# Patient Record
Sex: FEMALE | Race: WHITE | NOT HISPANIC OR LATINO | Employment: FULL TIME | URBAN - METROPOLITAN AREA
[De-identification: names, ages, dates, MRNs, and addresses within clinical notes are randomized per-mention and may not be internally consistent; named-entity substitution may affect disease eponyms.]

---

## 2018-09-13 ENCOUNTER — APPOINTMENT (OUTPATIENT)
Dept: RADIOLOGY | Facility: MEDICAL CENTER | Age: 53
End: 2018-09-13
Attending: STUDENT IN AN ORGANIZED HEALTH CARE EDUCATION/TRAINING PROGRAM
Payer: COMMERCIAL

## 2018-09-13 ENCOUNTER — HOSPITAL ENCOUNTER (OUTPATIENT)
Facility: MEDICAL CENTER | Age: 53
End: 2018-09-14
Attending: EMERGENCY MEDICINE | Admitting: INTERNAL MEDICINE
Payer: COMMERCIAL

## 2018-09-13 ENCOUNTER — APPOINTMENT (OUTPATIENT)
Dept: RADIOLOGY | Facility: MEDICAL CENTER | Age: 53
End: 2018-09-13
Attending: EMERGENCY MEDICINE
Payer: COMMERCIAL

## 2018-09-13 DIAGNOSIS — R07.9 CHEST PAIN SYNDROME: ICD-10-CM

## 2018-09-13 PROBLEM — J43.8 OTHER EMPHYSEMA (HCC): Status: ACTIVE | Noted: 2018-09-13

## 2018-09-13 PROBLEM — M47.812 SPONDYLOSIS OF CERVICAL REGION WITHOUT MYELOPATHY OR RADICULOPATHY: Status: ACTIVE | Noted: 2018-09-13

## 2018-09-13 PROBLEM — R10.31 RIGHT LOWER QUADRANT ABDOMINAL PAIN: Status: ACTIVE | Noted: 2018-09-13

## 2018-09-13 PROBLEM — J45.20 MILD INTERMITTENT ASTHMA WITHOUT COMPLICATION: Status: ACTIVE | Noted: 2018-09-13

## 2018-09-13 LAB
ALBUMIN SERPL BCP-MCNC: 4 G/DL (ref 3.2–4.9)
ALBUMIN/GLOB SERPL: 1.4 G/DL
ALP SERPL-CCNC: 62 U/L (ref 30–99)
ALT SERPL-CCNC: 9 U/L (ref 2–50)
ANION GAP SERPL CALC-SCNC: 7 MMOL/L (ref 0–11.9)
APTT PPP: 28.3 SEC (ref 24.7–36)
AST SERPL-CCNC: 16 U/L (ref 12–45)
BASOPHILS # BLD AUTO: 0.5 % (ref 0–1.8)
BASOPHILS # BLD: 0.05 K/UL (ref 0–0.12)
BILIRUB SERPL-MCNC: 0.9 MG/DL (ref 0.1–1.5)
BUN SERPL-MCNC: 12 MG/DL (ref 8–22)
CALCIUM SERPL-MCNC: 9.5 MG/DL (ref 8.5–10.5)
CHLORIDE SERPL-SCNC: 106 MMOL/L (ref 96–112)
CO2 SERPL-SCNC: 26 MMOL/L (ref 20–33)
CREAT SERPL-MCNC: 0.75 MG/DL (ref 0.5–1.4)
EKG IMPRESSION: NORMAL
EKG IMPRESSION: NORMAL
EOSINOPHIL # BLD AUTO: 0.25 K/UL (ref 0–0.51)
EOSINOPHIL NFR BLD: 2.6 % (ref 0–6.9)
ERYTHROCYTE [DISTWIDTH] IN BLOOD BY AUTOMATED COUNT: 49.2 FL (ref 35.9–50)
GLOBULIN SER CALC-MCNC: 2.8 G/DL (ref 1.9–3.5)
GLUCOSE SERPL-MCNC: 86 MG/DL (ref 65–99)
HCT VFR BLD AUTO: 42.8 % (ref 37–47)
HGB BLD-MCNC: 14 G/DL (ref 12–16)
IMM GRANULOCYTES # BLD AUTO: 0.03 K/UL (ref 0–0.11)
IMM GRANULOCYTES NFR BLD AUTO: 0.3 % (ref 0–0.9)
INR PPP: 1.04 (ref 0.87–1.13)
LYMPHOCYTES # BLD AUTO: 2.96 K/UL (ref 1–4.8)
LYMPHOCYTES NFR BLD: 30.6 % (ref 22–41)
MCH RBC QN AUTO: 29.9 PG (ref 27–33)
MCHC RBC AUTO-ENTMCNC: 32.7 G/DL (ref 33.6–35)
MCV RBC AUTO: 91.3 FL (ref 81.4–97.8)
MONOCYTES # BLD AUTO: 0.91 K/UL (ref 0–0.85)
MONOCYTES NFR BLD AUTO: 9.4 % (ref 0–13.4)
NEUTROPHILS # BLD AUTO: 5.47 K/UL (ref 2–7.15)
NEUTROPHILS NFR BLD: 56.6 % (ref 44–72)
NRBC # BLD AUTO: 0 K/UL
NRBC BLD-RTO: 0 /100 WBC
PLATELET # BLD AUTO: 176 K/UL (ref 164–446)
PMV BLD AUTO: 11.6 FL (ref 9–12.9)
POTASSIUM SERPL-SCNC: 4 MMOL/L (ref 3.6–5.5)
PROT SERPL-MCNC: 6.8 G/DL (ref 6–8.2)
PROTHROMBIN TIME: 13.3 SEC (ref 12–14.6)
RBC # BLD AUTO: 4.69 M/UL (ref 4.2–5.4)
SODIUM SERPL-SCNC: 139 MMOL/L (ref 135–145)
TROPONIN I SERPL-MCNC: <0.01 NG/ML (ref 0–0.04)
TROPONIN I SERPL-MCNC: <0.01 NG/ML (ref 0–0.04)
WBC # BLD AUTO: 9.7 K/UL (ref 4.8–10.8)

## 2018-09-13 PROCEDURE — 85025 COMPLETE CBC W/AUTO DIFF WBC: CPT

## 2018-09-13 PROCEDURE — 99285 EMERGENCY DEPT VISIT HI MDM: CPT

## 2018-09-13 PROCEDURE — 85730 THROMBOPLASTIN TIME PARTIAL: CPT

## 2018-09-13 PROCEDURE — A9270 NON-COVERED ITEM OR SERVICE: HCPCS | Performed by: STUDENT IN AN ORGANIZED HEALTH CARE EDUCATION/TRAINING PROGRAM

## 2018-09-13 PROCEDURE — 84484 ASSAY OF TROPONIN QUANT: CPT

## 2018-09-13 PROCEDURE — 99219 PR INITIAL OBSERVATION CARE,LEVL II: CPT | Mod: GC | Performed by: INTERNAL MEDICINE

## 2018-09-13 PROCEDURE — G0378 HOSPITAL OBSERVATION PER HR: HCPCS

## 2018-09-13 PROCEDURE — 74018 RADEX ABDOMEN 1 VIEW: CPT

## 2018-09-13 PROCEDURE — 96372 THER/PROPH/DIAG INJ SC/IM: CPT

## 2018-09-13 PROCEDURE — 700111 HCHG RX REV CODE 636 W/ 250 OVERRIDE (IP): Performed by: STUDENT IN AN ORGANIZED HEALTH CARE EDUCATION/TRAINING PROGRAM

## 2018-09-13 PROCEDURE — 700102 HCHG RX REV CODE 250 W/ 637 OVERRIDE(OP): Performed by: STUDENT IN AN ORGANIZED HEALTH CARE EDUCATION/TRAINING PROGRAM

## 2018-09-13 PROCEDURE — 93005 ELECTROCARDIOGRAM TRACING: CPT | Performed by: STUDENT IN AN ORGANIZED HEALTH CARE EDUCATION/TRAINING PROGRAM

## 2018-09-13 PROCEDURE — 36415 COLL VENOUS BLD VENIPUNCTURE: CPT

## 2018-09-13 PROCEDURE — 93005 ELECTROCARDIOGRAM TRACING: CPT | Performed by: EMERGENCY MEDICINE

## 2018-09-13 PROCEDURE — 85610 PROTHROMBIN TIME: CPT

## 2018-09-13 PROCEDURE — 93010 ELECTROCARDIOGRAM REPORT: CPT | Performed by: INTERNAL MEDICINE

## 2018-09-13 PROCEDURE — 80053 COMPREHEN METABOLIC PANEL: CPT

## 2018-09-13 PROCEDURE — 71045 X-RAY EXAM CHEST 1 VIEW: CPT

## 2018-09-13 PROCEDURE — 700102 HCHG RX REV CODE 250 W/ 637 OVERRIDE(OP): Performed by: EMERGENCY MEDICINE

## 2018-09-13 PROCEDURE — A9270 NON-COVERED ITEM OR SERVICE: HCPCS | Performed by: EMERGENCY MEDICINE

## 2018-09-13 RX ORDER — ONDANSETRON 4 MG/1
4 TABLET, ORALLY DISINTEGRATING ORAL EVERY 4 HOURS PRN
Status: DISCONTINUED | OUTPATIENT
Start: 2018-09-13 | End: 2018-09-14 | Stop reason: HOSPADM

## 2018-09-13 RX ORDER — POLYETHYLENE GLYCOL 3350 17 G/17G
1 POWDER, FOR SOLUTION ORAL
Status: DISCONTINUED | OUTPATIENT
Start: 2018-09-13 | End: 2018-09-14 | Stop reason: HOSPADM

## 2018-09-13 RX ORDER — M-VIT,TX,IRON,MINS/CALC/FOLIC 27MG-0.4MG
1 TABLET ORAL DAILY
Status: DISCONTINUED | OUTPATIENT
Start: 2018-09-13 | End: 2018-09-14 | Stop reason: HOSPADM

## 2018-09-13 RX ORDER — ACETAMINOPHEN 500 MG
500 TABLET ORAL EVERY 6 HOURS PRN
Status: DISCONTINUED | OUTPATIENT
Start: 2018-09-13 | End: 2018-09-14 | Stop reason: HOSPADM

## 2018-09-13 RX ORDER — ALBUTEROL SULFATE 90 UG/1
2 AEROSOL, METERED RESPIRATORY (INHALATION) EVERY 6 HOURS PRN
COMMUNITY

## 2018-09-13 RX ORDER — VARENICLINE TARTRATE 1 MG/1
1 TABLET, FILM COATED ORAL 2 TIMES DAILY
COMMUNITY

## 2018-09-13 RX ORDER — PROMETHAZINE HYDROCHLORIDE 25 MG/1
12.5-25 SUPPOSITORY RECTAL EVERY 4 HOURS PRN
Status: DISCONTINUED | OUTPATIENT
Start: 2018-09-13 | End: 2018-09-14 | Stop reason: HOSPADM

## 2018-09-13 RX ORDER — OMEPRAZOLE 20 MG/1
20 CAPSULE, DELAYED RELEASE ORAL DAILY
Status: DISCONTINUED | OUTPATIENT
Start: 2018-09-13 | End: 2018-09-14 | Stop reason: HOSPADM

## 2018-09-13 RX ORDER — AMOXICILLIN 250 MG
2 CAPSULE ORAL 2 TIMES DAILY
Status: DISCONTINUED | OUTPATIENT
Start: 2018-09-13 | End: 2018-09-14 | Stop reason: HOSPADM

## 2018-09-13 RX ORDER — PROMETHAZINE HYDROCHLORIDE 25 MG/1
12.5-25 TABLET ORAL EVERY 4 HOURS PRN
Status: DISCONTINUED | OUTPATIENT
Start: 2018-09-13 | End: 2018-09-14 | Stop reason: HOSPADM

## 2018-09-13 RX ORDER — HYDROCODONE BITARTRATE AND ACETAMINOPHEN 5; 325 MG/1; MG/1
0.5 TABLET ORAL
COMMUNITY

## 2018-09-13 RX ORDER — NICOTINE 21 MG/24HR
14 PATCH, TRANSDERMAL 24 HOURS TRANSDERMAL
Status: DISCONTINUED | OUTPATIENT
Start: 2018-09-13 | End: 2018-09-14 | Stop reason: HOSPADM

## 2018-09-13 RX ORDER — BISACODYL 10 MG
10 SUPPOSITORY, RECTAL RECTAL
Status: DISCONTINUED | OUTPATIENT
Start: 2018-09-13 | End: 2018-09-14 | Stop reason: HOSPADM

## 2018-09-13 RX ORDER — ASPIRIN 300 MG/1
300 SUPPOSITORY RECTAL ONCE
Status: COMPLETED | OUTPATIENT
Start: 2018-09-13 | End: 2018-09-13

## 2018-09-13 RX ORDER — LABETALOL HYDROCHLORIDE 5 MG/ML
10 INJECTION, SOLUTION INTRAVENOUS EVERY 4 HOURS PRN
Status: DISCONTINUED | OUTPATIENT
Start: 2018-09-13 | End: 2018-09-14 | Stop reason: HOSPADM

## 2018-09-13 RX ORDER — ASPIRIN 81 MG/1
324 TABLET, CHEWABLE ORAL ONCE
Status: COMPLETED | OUTPATIENT
Start: 2018-09-13 | End: 2018-09-13

## 2018-09-13 RX ORDER — ALBUTEROL SULFATE 90 UG/1
2 AEROSOL, METERED RESPIRATORY (INHALATION)
Status: DISCONTINUED | OUTPATIENT
Start: 2018-09-13 | End: 2018-09-14 | Stop reason: HOSPADM

## 2018-09-13 RX ORDER — ONDANSETRON 2 MG/ML
4 INJECTION INTRAMUSCULAR; INTRAVENOUS EVERY 4 HOURS PRN
Status: DISCONTINUED | OUTPATIENT
Start: 2018-09-13 | End: 2018-09-14 | Stop reason: HOSPADM

## 2018-09-13 RX ORDER — NITROGLYCERIN 0.4 MG/1
0.4 TABLET SUBLINGUAL
Status: DISCONTINUED | OUTPATIENT
Start: 2018-09-13 | End: 2018-09-14 | Stop reason: HOSPADM

## 2018-09-13 RX ADMIN — NICOTINE 14 MG: 14 PATCH, EXTENDED RELEASE TRANSDERMAL at 16:46

## 2018-09-13 RX ADMIN — OMEPRAZOLE 20 MG: 20 CAPSULE, DELAYED RELEASE ORAL at 16:46

## 2018-09-13 RX ADMIN — STANDARDIZED SENNA CONCENTRATE AND DOCUSATE SODIUM 2 TABLET: 8.6; 5 TABLET, FILM COATED ORAL at 17:57

## 2018-09-13 RX ADMIN — ALBUTEROL SULFATE 2 PUFF: 90 AEROSOL, METERED RESPIRATORY (INHALATION) at 16:44

## 2018-09-13 RX ADMIN — ASPIRIN 324 MG: 81 TABLET, CHEWABLE ORAL at 11:07

## 2018-09-13 RX ADMIN — ENOXAPARIN SODIUM 40 MG: 40 INJECTION SUBCUTANEOUS at 16:45

## 2018-09-13 RX ADMIN — MULTIPLE VITAMINS W/ MINERALS TAB 1 TABLET: TAB at 16:46

## 2018-09-13 ASSESSMENT — COPD QUESTIONNAIRES
COPD SCREENING SCORE: 5
HAVE YOU SMOKED AT LEAST 100 CIGARETTES IN YOUR ENTIRE LIFE: YES
DURING THE PAST 4 WEEKS HOW MUCH DID YOU FEEL SHORT OF BREATH: SOME OF THE TIME
DO YOU EVER COUGH UP ANY MUCUS OR PHLEGM?: YES, A FEW DAYS A WEEK OR MONTH

## 2018-09-13 ASSESSMENT — PATIENT HEALTH QUESTIONNAIRE - PHQ9
SUM OF ALL RESPONSES TO PHQ9 QUESTIONS 1 AND 2: 0
1. LITTLE INTEREST OR PLEASURE IN DOING THINGS: NOT AT ALL
2. FEELING DOWN, DEPRESSED, IRRITABLE, OR HOPELESS: NOT AT ALL

## 2018-09-13 ASSESSMENT — LIFESTYLE VARIABLES
DO YOU DRINK ALCOHOL: NO
ALCOHOL_USE: NO
EVER_SMOKED: YES
EVER_SMOKED: YES

## 2018-09-13 ASSESSMENT — PAIN SCALES - GENERAL
PAINLEVEL_OUTOF10: 0
PAINLEVEL_OUTOF10: 0
PAINLEVEL_OUTOF10: 5
PAINLEVEL_OUTOF10: 3

## 2018-09-13 NOTE — ED PROVIDER NOTES
ED Provider Note    Scribed for Jose De Jesus Graham M.D. by Darrell Waite. 9/13/2018  10:47 AM    Primary care provider: Brian Mandujano M.D.  Means of arrival: Walk-In  History obtained from: Patient  History limited by: None    CHIEF COMPLAINT  Chief Complaint   Patient presents with   • Abdominal Pain     started yesterday, seen at [facility]    • N/V       HPI  Maite Cui is a 53 y.o. female who presents to the Emergency Department complaining of abdominal pain onset 26 hours ago. Associated symptoms include nausea/vomiting, numbness, tingling, jaw pain, left arm pain, and left shoulder pain. The left arm & shoulder pain lasted for 30 minutes yesterday but has not recurred today. Patient denies fever. She noticed the numbness and tingling her in her feet while she was waiting to be seen at [facility] but the symptoms resolved later on their own.     REVIEW OF SYSTEMS  Pertinent negatives include no fever. As above, all other systems reviewed and are negative.   See HPI for further details.     PAST MEDICAL HISTORY   has a past medical history of Arthritis (12/15/16); Cataract (12/15/16); COPD (chronic obstructive pulmonary disease) with emphysema ([facility]) (2015); Heart burn (12/15/16); Pain (12/15/16); Psychiatric problem (12/15/16); Sleep apnea (7/2016); Snoring; and Urinary incontinence (12/15/16).    SURGICAL HISTORY   has a past surgical history that includes elbow exploration (Left, 2006); elbow exploration (Left, 2007); carpal tunnel release (Bilateral, 1998); mariel by laparoscopy (2002); tubal ligation (Bilateral, 2001); tonsillectomy (1987); dental extraction(s) (1985); and gastric sleeve laparoscopy (12/27/2016).    SOCIAL HISTORY  Social History   Substance Use Topics   • Smoking status: Former Smoker     Packs/day: 1.00     Years: 32.00     Quit date: 8/7/2016   • Smokeless tobacco: Never Used   • Alcohol use No      History   Drug Use No       FAMILY HISTORY  Her mother passed away from a 99% artery  "blockage.     CURRENT MEDICATIONS  No current facility-administered medications on file prior to encounter.      Current Outpatient Prescriptions on File Prior to Encounter   Medication Sig Dispense Refill   • Multiple Vitamins-Minerals (MULTIVITAMIN ADULT PO) Take  by mouth every day. Bariatric multivitamin     • omeprazole (PRILOSEC) 20 MG delayed-release capsule Take 20 mg by mouth every day.     • Calcium 500 MG Chew Tab Take  by mouth 2 Times a Day.     • Acetaminophen (TYLENOL PO) Take 1,000 mg by mouth as needed.     • ALBUTEROL SULFATE HFA INH Inhale  by mouth as needed.       ALLERGIES  No Known Allergies    PHYSICAL EXAM  VITAL SIGNS: /78   Pulse 76   Temp 36.9 °C (98.5 °F)   Resp 16   Ht 1.575 m (5' 2\")   Wt 76.9 kg (169 lb 8.5 oz)   SpO2 98%   BMI 31.01 kg/m²     Constitutional: Well developed, Well nourished, No acute distress, Non-toxic appearance.   HENT: Normocephalic, Atraumatic, Bilateral external ears normal, Oropharynx is clear mucous membranes are moist. No oral exudates or nasal discharge.   Eyes: Pupils are equal round and reactive, EOMI, Conjunctiva normal, No discharge.   Neck: Normal range of motion, No tenderness, Supple, No stridor. No meningismus.  Lymphatic: No lymphadenopathy noted.   Cardiovascular: Regular rate and rhythm without murmur rub or gallop.  Thorax & Lungs: Clear breath sounds bilaterally without wheezes, rhonchi or rales. There is no chest wall tenderness.   Abdomen: Right Lower Quadrant tenderness,  There is no rebound or guarding. No organomegaly is appreciated. Bowel sounds are normal.  Skin: Normal without rash.   Back: No CVA or spinal tenderness.   Extremities: Intact distal pulses, No edema, No tenderness, No cyanosis, No clubbing. Capillary refill is less than 3 seconds.  Musculoskeletal: Good range of motion in all major joints. No tenderness to palpation or major deformities noted.   Neurologic: Alert & oriented x 3, Normal motor function, Normal " sensory function, No focal deficits noted. Reflexes are normal.  Psychiatric: Affect normal, Judgment normal, Mood normal. There is no suicidal ideation or patient reported hallucinations.     DIAGNOSTIC STUDIES / PROCEDURES    LABS  Labs Reviewed   CBC WITH DIFFERENTIAL - Abnormal; Notable for the following:        Result Value    MCHC 32.7 (*)     Monos (Absolute) 0.91 (*)     All other components within normal limits    Narrative:     Indicate which anticoagulants the patient is on:->UNKNOWN   COMP METABOLIC PANEL    Narrative:     Indicate which anticoagulants the patient is on:->UNKNOWN   TROPONIN    Narrative:     Indicate which anticoagulants the patient is on:->UNKNOWN   APTT    Narrative:     Indicate which anticoagulants the patient is on:->UNKNOWN   PROTHROMBIN TIME    Narrative:     Indicate which anticoagulants the patient is on:->UNKNOWN   ESTIMATED GFR    Narrative:     Indicate which anticoagulants the patient is on:->UNKNOWN      All labs reviewed by me.    EKG Interpretation:  EKG Interpretation    Interpreted by emergency department physician    Rhythm: normal sinus   Rate: normal  Axis: normal  Ectopy: none  Conduction: normal  ST Segments: no acute change  T Waves: no acute change  Q Waves: nonspecific    Clinical Impression: non-specific EKG        RADIOLOGY  DX-CHEST-PORTABLE (1 VIEW)   Final Result      No consolidation.        The radiologist's interpretation of all radiological studies have been reviewed by me.    COURSE & MEDICAL DECISION MAKING  Nursing notes, VS, PMSFHx reviewed in chart.    10:47 AM Patient seen and examined at bedside. Ordered for DX Chest, Labs, and EKG to evaluate. Patient was treated with aspirin for her symptoms.    10:54 AM - Reviewed previous medical records. Diagnosed with atypical chest pain yesterday at [facility].     Laboratory evaluation reveals no leukocytosis, shift, anemia, electrolyte derangements, acidosis, renal or liver dysfunction.  Coagulation  studies are normal.  Troponin is also normal.  Chest x-ray shows no evidence of consolidation, pulmonary edema, airspace disease.  EKG upon arrival shows no evidence of ischemic changes or dysrhythmia.    12:48 PM - [facility] paged.     1:10 PM - [facility] responded. Discussed findings and they have agreed to admit her.  I think she needs an inpatient stress test for further evaluation for his presentation which is cardiac in nature    1:31 PM - Patient was reevaluated at bedside. Informed the patient that her lab tests and EKG all came back negative, but that we would still need to admit her. She is agreeable to this treatment plan.     The patient will return for new or worsening symptoms and is stable at the time of discharge.    Medications   aspirin (ASA) chewable tab 324 mg (324 mg Oral Given 9/13/18 1107)     Or   aspirin (ASA) suppository 300 mg ( Rectal See Alternative 9/13/18 1107)       DISPOSITION:  Patient will be admitted to [facility] in stable condition.     FINAL IMPRESSION  1. Chest pain syndrome          Darrell AMBROSIO (Jose M), am scribing for, and in the presence of, Jose De Jesus Graham M.D..    Electronically signed by: Darrell Waite (Jose M), 9/13/2018    Jose De Jesus AMBROSIO M.D. personally performed the services described in this documentation, as scribed by Darrell Waite in my presence, and it is both accurate and complete. C.     The note accurately reflects work and decisions made by me.  Jose De Jesus Graham  9/13/2018  1:48 PM

## 2018-09-13 NOTE — ASSESSMENT & PLAN NOTE
- Patient is compliant to medications.   - Controlled   - Continue on Albuterol  - Avoid triggers   - Respiratory protocol.

## 2018-09-13 NOTE — PROGRESS NOTES
Received pt from ED.  Pt is A/Ox4.  Respirations are even and unlabored on RA.  Pt denies any abdominal pain or CP at this time.  Bowel sounds normal active in all 4Q.  Pt abdomen is soft and non-tender.  Pt states it as been at least 2 days sense her last bowel movement.  Monitors applied, VSS.  Will continue to closely monitor. Call light within reach.

## 2018-09-13 NOTE — ASSESSMENT & PLAN NOTE
- Rt lower abdominal pain associated with nauseas and vomiting.   - Progressive and intermittent since yesterday.   - No diarrhea, no constipation, not related to diet, no NSAID use, hx of negative H.Pylori.   - Vitally stable a febrile, RLQ and epigastric tenderness.  - CBC: no leucocytosis  - KUB shows moderate constipation.   - Troponin 2 sets are normal   - US abdomen: showed unremarkable liver and biliary tree. No intrahepatic biliary.   - Myocardial stress test today shows no evidence of significant jeopardized viable myocardium or prior myocardial infarction.Normal left ventricular size, ejection fraction, and wall motion.    Plan:   - Likely because of constipation  - Patient will be discharged today on PPI with f/u with PCP in a week

## 2018-09-13 NOTE — H&P
Senior Admission Note    In summary: Maite Cui is a 53 y.o. female with past medical history COPD, GERD, KRISTIN, urinary incontinence, chronic low back pain of presented to the ED with abdominal pain of 1 day duration. Pt was recently seen at [facility] for this and underwent brief cardiac chest pain workup, troponins and EKGs were not suggestive of EKGs so she was discharged from the ED. Patient presented to our ED with similar symptoms, she notes RUQ/RLQ pain with N/V as well. Denies any other acute concerns or complaints. Admit to tele for CP rule out and investigation of abd pain.       Pertinent physical exam findings:    Abd: RUQ/RLQ tenderness, larose sign positive, no distension or rigidity  Cards: RRR, no murmurs  Pulm: CTAB    Pertinent studies:    CBC/CMP wnl  Trop and EKG not suggestive of ischemia   CXR showed no acute pathology    Assessment and plan in summary:    1.Abd pain   - Hx of gastric sleeve surg and cholecystectomy   - CMP wnl   - Gastritis vs biliary colic vs cardiac   - Trend trops/EKGs   -  NPO at midnight for stress   - RUQ US and KUB    2.COPD/GERD/KRISTIN/Incontinence/Back pain   -Continue appropriate home meds    For full plan, please see Intern note for details   Mohan Mari M.D.  PGY 2

## 2018-09-13 NOTE — CARE PLAN
Problem: Safety  Goal: Free from accidental injury    Intervention: Initiate Safety Measures  Fall precautions in place, call light within reach.      Problem: Knowledge Deficit  Goal: Patient/Significant other demonstrates understanding of disease process, treatment plan, medications and discharge instructions  Outcome: PROGRESSING AS EXPECTED  Pt aware of plan of care for stress test in AM and NPO at midnight.  Will continue to closely monitor.

## 2018-09-13 NOTE — ASSESSMENT & PLAN NOTE
- Patient is compliant to medications.   - Controlled   - Continue on Albuterol  - Avoid triggers   - Respiratory protocol

## 2018-09-13 NOTE — H&P
Internal Medicine Admitting History and Physical    Note Author: Garett Oliva M.D.       Name Maite Cui 1965   Age/Sex 53 y.o. female   MRN [id number]   Code Status Full code     After 5PM or if no immediate response to page, please call for cross-coverage  Attending/Team: Dr.Sandesh FEDERICO Partida MD/Junior team See Patient List for primary contact information  Call [phone] to page    1st Call - Day Intern (R1):   Garett Oliva 2nd Call - Day Sr. Resident (R2/R3):   Mohan Obrien        Chief Complaint:   Abdominal pain.     HPI:  54 yo female patient with past medical history of GERD, B.A, Cataract, COPD not on oxygen on home controlled on prn inhalers. Presented to ED c/o rt lower quadrant pain for the last 2 days, 9/10 yesterday but to day about 5/10. Dull aching pain, started yesterday at 8 a.m. Non radiating, aggravated with movement and relieved with staying still. Associated with nausea, vomiting 8-10 times food particles in it.  Not using NSAID. No fever, no chills, no recent travel history, no trauma. No falls. No cough. Pt reports having upper GI endoscopy done showed inflamed esophagus and colonoscopy came back normal. No recent change in bowel habits. For his pain she presented to ED at [facility] EKG and (blood test ) was done and both normal, patient got discharged home on PPI. Next morning she woke up with less pain, he son decided to get her to [facility] for evaluation. No chest pain, no sob, no PND no orthopnea. Not on oxygen at home.    ROS   No fever, no chills or  Dysuria          Past Medical History (Chronic medical problem, known complications and current treatment)    has a past medical history of Arthritis (12/15/16); Cataract (12/15/16); COPD (chronic obstructive pulmonary disease) with emphysema (CMS-HCC) (); Heart burn (12/15/16); Pain (12/15/16); Psychiatric problem (12/15/16); Sleep apnea (2016); Snoring; and Urinary  incontinence (12/15/16).    Past Surgical History:  Past Surgical History:   Procedure Laterality Date   • GASTRIC SLEEVE LAPAROSCOPY  2016    Procedure: GASTRIC SLEEVE LAPAROSCOPY;  Surgeon: John H Ganser, M.D.;  Location: SURGERY [facility];  Service:    • ELBOW EXPLORATION Left 2007    nerve transposition   • ELBOW EXPLORATION Left 2006    cleaned around nerve   • JAMARCUS BY LAPAROSCOPY     • TUBAL LIGATION Bilateral    • CARPAL TUNNEL RELEASE Bilateral    • TONSILLECTOMY     • DENTAL EXTRACTION(S)         Current Outpatient Medications:  Home Medications     Reviewed by Jim Castle (Pharmacy Tech) on 18 at 1319  Med List Status: Complete   Medication Last Dose Status   albuterol 108 (90 Base) MCG/ACT Aero Soln inhalation aerosol 2018 Active   Calcium 500 MG Chew Tab 2018 Active   fluticasone-salmeterol (ADVAIR) 100-50 MCG/DOSE AEROSOL POWDER, BREATH ACTIVATED 2018 Active   HYDROcodone-acetaminophen (NORCO) 5-325 MG Tab per tablet 2018 Active   Multiple Vitamins-Minerals (MULTIVITAMIN ADULT PO) 2018 Active   omeprazole (PRILOSEC) 20 MG delayed-release capsule 2018 Active   varenicline (CHANTIX) 1 MG tablet 2018 Active                Medication Allergy/Sensitivities:  No Known Allergies      Family History (mandatory)   - Grandfather  of heart attack at the age of 50. Grandmother  of stroke. Mom  of heart attack.     Social History (mandatory)   Social History     Social History   • Marital status:      Spouse name: N/A   • Number of children: N/A   • Years of education: N/A     Occupational History   • Not on file.     Social History Main Topics   • Smoking status: Former Smoker     Packs/day: 1.00     Years: 32.00     Quit date: 2016   • Smokeless tobacco: Never Used   • Alcohol use No   • Drug use: No   • Sexual activity: Not on file     Other Topics Concern   • Not on file     Social History Narrative   •  "No narrative on file     Living situation: Lives in [place] with daughter and son.   PCP : Brian Mandujano M.D.    Physical Exam     Vitals:    09/13/18 0943 09/13/18 1054 09/13/18 1146 09/13/18 1444   BP:  143/95  132/92   Pulse:  72 64 64   Resp:  18 18 14   Temp:       SpO2:  100% 96% 94%   Weight: 76.9 kg (169 lb 8.5 oz)      Height: 1.575 m (5' 2\")        Body mass index is 31.01 kg/m².  /92   Pulse 64   Temp 36.9 °C (98.5 °F)   Resp 14   Ht 1.575 m (5' 2\")   Wt 76.9 kg (169 lb 8.5 oz)   SpO2 94%   BMI 31.01 kg/m²   O2 therapy: Pulse Oximetry: 94 %    Physical Exam     Constitutional: Well developed, Well nourished, No acute distress, Non-toxic appearance. , not distressed.    HENT: Normocephalic, Atraumatic, Bilateral external ears normal, Oropharynx is clear mucous membranes are moist. No oral exudates or nasal discharge.    Eyes: Pupils are equal round and reactive, EOMI, Conjunctiva normal, No discharge.    Neck: Normal range of motion, No tenderness, Supple, No stridor. No meningismus.   Lymphatic: No lymphadenopathy noted.    Cardiovascular: Regular rate and rhythm without murmur rub or gallop.  Thorax & Lungs: Clear breath sounds bilaterally without wheezes, rhonchi or rales. There is no chest wall tenderness.   Abdomen: Right Lower Quadrant tenderness,  There is no rebound or guarding. No organomegaly is appreciated. Bowel sounds are audible. No rebound tenderness.    Skin: Normal without rash.    Back: No CVA or spinal tenderness.    Extremities: Intact distal pulses, No edema, No tenderness, No cyanosis, No clubbing. Capillary refill is less than 3 seconds.   Musculoskeletal: Good range of motion in all major joints. No tenderness to palpation or major deformities noted.    Neurologic: Alert & oriented x 4, Normal motor function, Normal sensory function, No focal deficits noted. Reflexes are normal.   Psychiatric: Affect normal, Judgment normal, Mood normal. There is no suicidal ideation " or patient reported hallucinations.          Data Review       Old Records Request:   Deferred  Current Records review/summary: Deferred    Lab Data Review:  Recent Results (from the past 24 hour(s))   EKG (NOW)    Collection Time: 18 11:00 AM   Result Value Ref Range    Report       [facility] Emergency Dept.    Test Date:  2018  Pt Name:    MAREK GIBSON             Department: ER  MRN:        [id number]                      Room:       [facility]  Gender:     Female                       Technician: [id number]  :        1965                   Requested By:BOOM GARNETT  Order #:    [id number]                    Reading MD: BOOM GARNETT MD    Measurements  Intervals                                Axis  Rate:       64                           P:          -14  DE:         136                          QRS:        28  QRSD:       86                           T:          9  QT:         384  QTc:        396    Interpretive Statements  SINUS RHYTHM  ATRIAL PREMATURE COMPLEX  No previous ECG available for comparison    Electronically Signed On 2018 13:48:07 PDT by BOMO GARNETT MD     CBC w/ Differential    Collection Time: 18 11:04 AM   Result Value Ref Range    WBC 9.7 4.8 - 10.8 K/uL    RBC 4.69 4.20 - 5.40 M/uL    Hemoglobin 14.0 12.0 - 16.0 g/dL    Hematocrit 42.8 37.0 - 47.0 %    MCV 91.3 81.4 - 97.8 fL    MCH 29.9 27.0 - 33.0 pg    MCHC 32.7 (L) 33.6 - 35.0 g/dL    RDW 49.2 35.9 - 50.0 fL    Platelet Count 176 164 - 446 K/uL    MPV 11.6 9.0 - 12.9 fL    Neutrophils-Polys 56.60 44.00 - 72.00 %    Lymphocytes 30.60 22.00 - 41.00 %    Monocytes 9.40 0.00 - 13.40 %    Eosinophils 2.60 0.00 - 6.90 %    Basophils 0.50 0.00 - 1.80 %    Immature Granulocytes 0.30 0.00 - 0.90 %    Nucleated RBC 0.00 /100 WBC    Neutrophils (Absolute) 5.47 2.00 - 7.15 K/uL    Lymphs (Absolute) 2.96 1.00 - 4.80 K/uL    Monos (Absolute) 0.91 (H) 0.00 - 0.85 K/uL    Eos (Absolute) 0.25 0.00 - 0.51 K/uL     Baso (Absolute) 0.05 0.00 - 0.12 K/uL    Immature Granulocytes (abs) 0.03 0.00 - 0.11 K/uL    NRBC (Absolute) 0.00 K/uL   Complete Metabolic Panel (CMP)    Collection Time: 09/13/18 11:04 AM   Result Value Ref Range    Sodium 139 135 - 145 mmol/L    Potassium 4.0 3.6 - 5.5 mmol/L    Chloride 106 96 - 112 mmol/L    Co2 26 20 - 33 mmol/L    Anion Gap 7.0 0.0 - 11.9    Glucose 86 65 - 99 mg/dL    Bun 12 8 - 22 mg/dL    Creatinine 0.75 0.50 - 1.40 mg/dL    Calcium 9.5 8.5 - 10.5 mg/dL    AST(SGOT) 16 12 - 45 U/L    ALT(SGPT) 9 2 - 50 U/L    Alkaline Phosphatase 62 30 - 99 U/L    Total Bilirubin 0.9 0.1 - 1.5 mg/dL    Albumin 4.0 3.2 - 4.9 g/dL    Total Protein 6.8 6.0 - 8.2 g/dL    Globulin 2.8 1.9 - 3.5 g/dL    A-G Ratio 1.4 g/dL   Troponin STAT    Collection Time: 09/13/18 11:04 AM   Result Value Ref Range    Troponin I <0.01 0.00 - 0.04 ng/mL   APTT    Collection Time: 09/13/18 11:04 AM   Result Value Ref Range    APTT 28.3 24.7 - 36.0 sec   PT/INR    Collection Time: 09/13/18 11:04 AM   Result Value Ref Range    PT 13.3 12.0 - 14.6 sec    INR 1.04 0.87 - 1.13   ESTIMATED GFR    Collection Time: 09/13/18 11:04 AM   Result Value Ref Range    GFR If African American >60 >60 mL/min/1.73 m 2    GFR If Non African American >60 >60 mL/min/1.73 m 2       Imaging/Procedures Review:    Independant Imaging Review: Completed  DX-CHEST-PORTABLE (1 VIEW)   Final Result      No consolidation.      [id number] VIEW    (Results Pending)   US-LIVER AND BILIARY TREE    (Results Pending)     EKG:   EKG Independant Review: Completed  [id number] :, HR:64  , Normal Sinus Rhythm, no ST/T change  ATRIAL PREMATURE COMPLEX     Records reviewed and summarized in current documentation :  Yes  [employer] teaching service handout given to patient:  No         Assessment/Plan     Other emphysema (HCC)   Assessment & Plan    - Patient is compliant to medications.   - Controlled   - Continue on Albuterol  - Avoid triggers   - Respiratory protoco         Spondylosis of cervical region without myelopathy or radiculopathy- (present on admission)   Assessment & Plan    - Controlled on Acetaminophen.         Right lower quadrant abdominal pain   Assessment & Plan    - Rt lower abdominal pain associated with nauseas and vomiting.   - Progressive and intermittent since yesterday.   - No diarrhea, no constipation, not related to diet, no NSAID use, hx of negative H.Pylori.   - Vitally stable a febrile, RLQ and epigastric tenderness.  - CBC: no leucocytosis  - CT abdomen:   - Troponin 1 set normal.     Plan:   - Could be presentation of CAD.   - Admit for telemetry, serial troponin and EKG   - KUB and US limited abdomen.   - prn Nitroglycerine   - NPO midnight for Cardiac stress test tomorrow.              Anticipated Hospital stay: Observation admit     Quality Measures  Quality-Core Measures  PCP: Brian Mandujano M.D.

## 2018-09-13 NOTE — ED TRIAGE NOTES
52 y/o female ambulate to triage   Chief Complaint   Patient presents with   • Abdominal Pain     started yesterday, seen at [facility]    • N/V     Pt state she was told she had gastritis, but the pain has not gone away.  Pt denies dysuria

## 2018-09-14 ENCOUNTER — PATIENT OUTREACH (OUTPATIENT)
Dept: HEALTH INFORMATION MANAGEMENT | Facility: OTHER | Age: 53
End: 2018-09-14

## 2018-09-14 ENCOUNTER — APPOINTMENT (OUTPATIENT)
Dept: RADIOLOGY | Facility: MEDICAL CENTER | Age: 53
End: 2018-09-14
Attending: STUDENT IN AN ORGANIZED HEALTH CARE EDUCATION/TRAINING PROGRAM
Payer: COMMERCIAL

## 2018-09-14 VITALS
HEIGHT: 62 IN | OXYGEN SATURATION: 96 % | BODY MASS INDEX: 31.81 KG/M2 | RESPIRATION RATE: 20 BRPM | SYSTOLIC BLOOD PRESSURE: 137 MMHG | HEART RATE: 64 BPM | TEMPERATURE: 98 F | WEIGHT: 172.84 LBS | DIASTOLIC BLOOD PRESSURE: 76 MMHG

## 2018-09-14 LAB
ALBUMIN SERPL BCP-MCNC: 3.7 G/DL (ref 3.2–4.9)
ALBUMIN/GLOB SERPL: 1.3 G/DL
ALP SERPL-CCNC: 55 U/L (ref 30–99)
ALT SERPL-CCNC: 11 U/L (ref 2–50)
ANION GAP SERPL CALC-SCNC: 6 MMOL/L (ref 0–11.9)
AST SERPL-CCNC: 14 U/L (ref 12–45)
BASOPHILS # BLD AUTO: 0.5 % (ref 0–1.8)
BASOPHILS # BLD: 0.03 K/UL (ref 0–0.12)
BILIRUB SERPL-MCNC: 0.8 MG/DL (ref 0.1–1.5)
BUN SERPL-MCNC: 12 MG/DL (ref 8–22)
CALCIUM SERPL-MCNC: 9.3 MG/DL (ref 8.5–10.5)
CHLORIDE SERPL-SCNC: 107 MMOL/L (ref 96–112)
CO2 SERPL-SCNC: 24 MMOL/L (ref 20–33)
CREAT SERPL-MCNC: 0.63 MG/DL (ref 0.5–1.4)
EOSINOPHIL # BLD AUTO: 0.29 K/UL (ref 0–0.51)
EOSINOPHIL NFR BLD: 4.6 % (ref 0–6.9)
ERYTHROCYTE [DISTWIDTH] IN BLOOD BY AUTOMATED COUNT: 49.2 FL (ref 35.9–50)
GLOBULIN SER CALC-MCNC: 2.9 G/DL (ref 1.9–3.5)
GLUCOSE SERPL-MCNC: 95 MG/DL (ref 65–99)
HCT VFR BLD AUTO: 41.8 % (ref 37–47)
HGB BLD-MCNC: 14.3 G/DL (ref 12–16)
IMM GRANULOCYTES # BLD AUTO: 0.02 K/UL (ref 0–0.11)
IMM GRANULOCYTES NFR BLD AUTO: 0.3 % (ref 0–0.9)
LIPASE SERPL-CCNC: 26 U/L (ref 11–82)
LYMPHOCYTES # BLD AUTO: 2.16 K/UL (ref 1–4.8)
LYMPHOCYTES NFR BLD: 34 % (ref 22–41)
MCH RBC QN AUTO: 31 PG (ref 27–33)
MCHC RBC AUTO-ENTMCNC: 34.2 G/DL (ref 33.6–35)
MCV RBC AUTO: 90.7 FL (ref 81.4–97.8)
MONOCYTES # BLD AUTO: 0.59 K/UL (ref 0–0.85)
MONOCYTES NFR BLD AUTO: 9.3 % (ref 0–13.4)
NEUTROPHILS # BLD AUTO: 3.26 K/UL (ref 2–7.15)
NEUTROPHILS NFR BLD: 51.3 % (ref 44–72)
NRBC # BLD AUTO: 0 K/UL
NRBC BLD-RTO: 0 /100 WBC
PLATELET # BLD AUTO: 164 K/UL (ref 164–446)
PMV BLD AUTO: 11.3 FL (ref 9–12.9)
POTASSIUM SERPL-SCNC: 4.2 MMOL/L (ref 3.6–5.5)
PROT SERPL-MCNC: 6.6 G/DL (ref 6–8.2)
RBC # BLD AUTO: 4.61 M/UL (ref 4.2–5.4)
SODIUM SERPL-SCNC: 137 MMOL/L (ref 135–145)
WBC # BLD AUTO: 6.4 K/UL (ref 4.8–10.8)

## 2018-09-14 PROCEDURE — 90471 IMMUNIZATION ADMIN: CPT

## 2018-09-14 PROCEDURE — G0378 HOSPITAL OBSERVATION PER HR: HCPCS

## 2018-09-14 PROCEDURE — 96372 THER/PROPH/DIAG INJ SC/IM: CPT

## 2018-09-14 PROCEDURE — 700111 HCHG RX REV CODE 636 W/ 250 OVERRIDE (IP)

## 2018-09-14 PROCEDURE — 700102 HCHG RX REV CODE 250 W/ 637 OVERRIDE(OP): Performed by: STUDENT IN AN ORGANIZED HEALTH CARE EDUCATION/TRAINING PROGRAM

## 2018-09-14 PROCEDURE — 90686 IIV4 VACC NO PRSV 0.5 ML IM: CPT

## 2018-09-14 PROCEDURE — 85025 COMPLETE CBC W/AUTO DIFF WBC: CPT

## 2018-09-14 PROCEDURE — 700111 HCHG RX REV CODE 636 W/ 250 OVERRIDE (IP): Performed by: STUDENT IN AN ORGANIZED HEALTH CARE EDUCATION/TRAINING PROGRAM

## 2018-09-14 PROCEDURE — 80053 COMPREHEN METABOLIC PANEL: CPT

## 2018-09-14 PROCEDURE — A9502 TC99M TETROFOSMIN: HCPCS

## 2018-09-14 PROCEDURE — 83690 ASSAY OF LIPASE: CPT

## 2018-09-14 PROCEDURE — 99217 PR OBSERVATION CARE DISCHARGE: CPT | Mod: GC | Performed by: INTERNAL MEDICINE

## 2018-09-14 PROCEDURE — 76705 ECHO EXAM OF ABDOMEN: CPT

## 2018-09-14 PROCEDURE — A9270 NON-COVERED ITEM OR SERVICE: HCPCS | Performed by: STUDENT IN AN ORGANIZED HEALTH CARE EDUCATION/TRAINING PROGRAM

## 2018-09-14 RX ORDER — OMEPRAZOLE 20 MG/1
40 CAPSULE, DELAYED RELEASE ORAL 2 TIMES DAILY
Qty: 60 CAP | Refills: 0 | Status: SHIPPED | OUTPATIENT
Start: 2018-09-14 | End: 2018-10-12

## 2018-09-14 RX ORDER — REGADENOSON 0.08 MG/ML
INJECTION, SOLUTION INTRAVENOUS
Status: COMPLETED
Start: 2018-09-14 | End: 2018-09-14

## 2018-09-14 RX ADMIN — ENOXAPARIN SODIUM 40 MG: 40 INJECTION SUBCUTANEOUS at 05:17

## 2018-09-14 RX ADMIN — MULTIPLE VITAMINS W/ MINERALS TAB 1 TABLET: TAB at 05:18

## 2018-09-14 RX ADMIN — REGADENOSON 0.4 MG: 0.08 INJECTION, SOLUTION INTRAVENOUS at 11:39

## 2018-09-14 RX ADMIN — NICOTINE 14 MG: 14 PATCH, EXTENDED RELEASE TRANSDERMAL at 05:19

## 2018-09-14 RX ADMIN — INFLUENZA A VIRUS A/MICHIGAN/45/2015 X-275 (H1N1) ANTIGEN (FORMALDEHYDE INACTIVATED), INFLUENZA A VIRUS A/SINGAPORE/INFIMH-16-0019/2016 IVR-186 (H3N2) ANTIGEN (FORMALDEHYDE INACTIVATED), INFLUENZA B VIRUS B/PHUKET/3073/2013 ANTIGEN (FORMALDEHYDE INACTIVATED), AND INFLUENZA B VIRUS B/MARYLAND/15/2016 BX-69A ANTIGEN (FORMALDEHYDE INACTIVATED) 0.5 ML: 15; 15; 15; 15 INJECTION, SUSPENSION INTRAMUSCULAR at 16:00

## 2018-09-14 RX ADMIN — STANDARDIZED SENNA CONCENTRATE AND DOCUSATE SODIUM 2 TABLET: 8.6; 5 TABLET, FILM COATED ORAL at 06:00

## 2018-09-14 RX ADMIN — OMEPRAZOLE 20 MG: 20 CAPSULE, DELAYED RELEASE ORAL at 05:18

## 2018-09-14 ASSESSMENT — PAIN SCALES - GENERAL: PAINLEVEL_OUTOF10: 0

## 2018-09-14 NOTE — DISCHARGE SUMMARY
Internal Medicine Discharge Summary  Note Author: Garett Oliva M.D.       Admit Date:  9/13/2018       Discharge Date:  9/14/2018    Service:   R Internal Medicine Purple team Team  Attending Physician(s):   Michael Partida MD    Senior Resident(s):   Mohan Dowell MD   Erlin Resident(s):   Garett Oliva MD   PCP: Brian Mandujano M.D.      Primary Diagnosis:   @PRIMARY PROBLEM@  Abdominal pain   Secondary Diagnoses:                Active Problems:    Right lower quadrant abdominal pain POA: Unknown    Spondylosis of cervical region without myelopathy or radiculopathy POA: Yes      Overview: - Controlled with Acetaminophen     Other emphysema (HCC) POA: Unknown  Resolved Problems:    * No resolved hospital problems. *    Hospital Summary (Brief Narrative):         54 yo female patient with past medical history of GERD, Cataract, COPD not on oxygen on home controlled on prn inhalers. Presented to ED c/o rt lower quadrant pain for the last 2 days, 9/10 Dull aching pain, started yesterday the day before admission, non radiating, aggravated with movement and relieved with staying still. Associated with nausea, vomiting 8-10 times food particles in it.  Pt reports having upper GI endoscopy done showed inflamed esophagus and colonoscopy came back normal.  She initially presented to ED at [facility] EKG and heart related blood test according to patient was done and both normal, patient got discharged home on PPI. Next day pain didn't improve therefore she presented to [facility] ED. In ED EKG showed sinus rhythm with premature atrial complexes, CBC showed no leucocytosis, CMP shows no abnormalities, normal liver enzymes, Troponin repeated twice came back normal, normal coagulation profile. Patient got admitted for observation in addition to further work up to investigate the cause of abdominal pain more including serum lipase,  myocardial stress test, KUB x ray and abdominal US. Abdominal x  ray showed moderate constipation, abdominal US showed unremarkable liver and biliary tree. No intrahepatic biliary dilatation. Lipase came back normal.  Myocardial stress test shows no evidence of significant jeopardized viable myocardium or prior myocardial infarction.Normal left ventricular size, ejection fraction, and wall motion.  Patient pain and nausea were managed with Zofran, Tylenol and Omeprazole. At the day of dishcarge patient reports improving abdominal pain and no nausea. Patient has been informed with findings of imaging studies and Myocardial stress test. Patient was discharged on Omeprazole  with a plan to follow up with PCP in 1 week.     Patient /Hospital Summary (Details -- Problem Oriented) :          Other emphysema (HCC)   Assessment & Plan    - Patient is compliant to medications.   - Controlled   - Continue on Albuterol  - Avoid triggers   - Respiratory protocol        Spondylosis of cervical region without myelopathy or radiculopathy   Assessment & Plan    - Controlled on Acetaminophen.         Right lower quadrant abdominal pain   Assessment & Plan    - Rt lower abdominal pain associated with nauseas and vomiting.   - Progressive and intermittent since yesterday.   - No diarrhea, no constipation, not related to diet, no NSAID use, hx of negative H.Pylori.   - Vitally stable a febrile, RLQ and epigastric tenderness.  - CBC: no leucocytosis  - KUB shows moderate constipation.   - Troponin 2 sets are normal   - US abdomen: showed unremarkable liver and biliary tree. No intrahepatic biliary.   - Myocardial stress test today shows no evidence of significant jeopardized viable myocardium or prior myocardial infarction.Normal left ventricular size, ejection fraction, and wall motion.    Plan:   - Likely because of constipation  - Patient will be discharged today on PPI with f/u with PCP in a week            Consultants:     None     Procedures:        None     Imaging/ Testing:      Abdominal X  ray:Moderate constipation. No evidence of bowel obstruction  US liver: Unremarkable liver and biliary tree. No intrahepatic biliary dilatation. Cholecystectomy.  Chest x ray:  No consolidation     Discharge Medications:         Medication Reconciliation: Completed       Medication List      CHANGE how you take these medications      Instructions   omeprazole 20 MG delayed-release capsule  What changed:  · how much to take  · when to take this  Commonly known as:  PRILOSEC   Doctor's comments:  Take for 4 weeks for gastritis  Take 2 Caps by mouth 2 times a day for 28 days.  Dose:  40 mg        CONTINUE taking these medications      Instructions   albuterol 108 (90 Base) MCG/ACT Aers inhalation aerosol   Inhale 2 Puffs by mouth every 6 hours as needed for Shortness of Breath.  Dose:  2 Puff     Calcium 500 MG Chew   Take 1 Tab by mouth 2 Times a Day.  Dose:  1 Tab     CHANTIX 1 MG tablet  Generic drug:  varenicline   Take 1 mg by mouth 2 times a day.  Dose:  1 mg     fluticasone-salmeterol 100-50 MCG/DOSE Aepb  Commonly known as:  ADVAIR   Inhale 1 Puff by mouth every 12 hours.  Dose:  1 Puff     HYDROcodone-acetaminophen 5-325 MG Tabs per tablet  Commonly known as:  NORCO   Take 0.5 Tabs by mouth 1 time daily as needed.  Dose:  0.5 Tab     MULTIVITAMIN ADULT PO   Take 1 Tab by mouth every day. Bariatric multivitamin  Dose:  1 Tab            Disposition: Home    Diet: Regular     Activity:  As tolerated    Instructions:        - Please avoid spicy or fatty food.  - Please follow up with PCP in 1 week.     The patient was instructed to return to the ER in the event of worsening symptoms. I have counseled the patient on the importance of compliance and the patient has agreed to proceed with all medical recommendations and follow up plan indicated above.   The patient understands that all medications come with benefits and risks. Risks may include permanent injury or death and these risks can be minimized with close  reassessment and monitoring.        Primary Care Provider:    Discharge summary faxed to primary care provider:  Deferred  Copy of discharge summary given to the patient: Completed      Follow up appointment details :      - PCP in 1 week    Pending Studies:        None    Time spent on discharge day patient visit, preparing discharge paperwork and arranging for patient follow up.    Summary of follow up issues:       Discharge Time (Minutes) :   4 p.m  Hospital Course Type: Inpatient Stay < 2 midnights, patient recovered more rapidly than anticipated      Condition on Discharge :  Pain has markedly improved the day of discharge. Vitally stable afebrile.   ______________________________________________________________________    Interval history/exam for day of discharge:         Vitals:    09/14/18 0050 09/14/18 0509 09/14/18 0837 09/14/18 1334   BP: 115/74 136/82 160/79 137/76   Pulse: 70 75 77 64   Resp: 16 15 18 20   Temp: 36.4 °C (97.6 °F) 36.7 °C (98 °F) 36.8 °C (98.3 °F) 36.7 °C (98 °F)   SpO2: 92% 97% 95% 96%   Weight:       Height:         Weight/BMI: Body mass index is 31.61 kg/m².  Pulse Oximetry: 96 %, O2 (LPM): 0, O2 Delivery: None (Room Air)     Constitutional: Well developed, Well nourished, No acute distress, Non-toxic appearance. , not distressed.     HENT: Normocephalic, Atraumatic, Bilateral external ears normal, Oropharynx is clear mucous membranes are moist. No oral exudates or nasal discharge.     Eyes: Pupils are equal round and reactive, EOMI, Conjunctiva normal, No discharge.     Neck: Normal range of motion, No tenderness, Supple, No stridor. No meningismus.  Lymphatic: No lymphadenopathy noted.   Cardiovascular: Regular rate and rhythm without murmur rub or gallop.  Thorax & Lungs: Clear breath sounds bilaterally without wheezes, rhonchi or rales. There is no chest wall tenderness.   Abdomen: Right Lower Quadrant tenderness,  There is no rebound or guarding. No organomegaly is appreciated.  Bowel sounds are audible. No rebound tenderness.   Skin: Normal without rash.    Back: No CVA or spinal tenderness.   Extremities: Intact distal pulses, No edema, No tenderness, No cyanosis, No clubbing. Capillary refill is less than 3 seconds.  Musculoskeletal: Good range of motion in all major joints. No tenderness to palpation or major deformities noted.    Neurologic: Alert & oriented x 4, Normal motor function, Normal sensory function, No focal deficits noted. Reflexes are normal.   Psychiatric: Affect normal, Judgment normal, Mood normal. There is no suicidal ideation or patient reported hallucinations.     Most Recent Labs:    Lab Results   Component Value Date/Time    WBC 6.4 09/14/2018 05:10 AM    RBC 4.61 09/14/2018 05:10 AM    HEMOGLOBIN 14.3 09/14/2018 05:10 AM    HEMATOCRIT 41.8 09/14/2018 05:10 AM    MCV 90.7 09/14/2018 05:10 AM    MCH 31.0 09/14/2018 05:10 AM    MCHC 34.2 09/14/2018 05:10 AM    MPV 11.3 09/14/2018 05:10 AM    NEUTSPOLYS 51.30 09/14/2018 05:10 AM    LYMPHOCYTES 34.00 09/14/2018 05:10 AM    MONOCYTES 9.30 09/14/2018 05:10 AM    EOSINOPHILS 4.60 09/14/2018 05:10 AM    BASOPHILS 0.50 09/14/2018 05:10 AM      Lab Results   Component Value Date/Time    SODIUM 137 09/14/2018 05:10 AM    POTASSIUM 4.2 09/14/2018 05:10 AM    CHLORIDE 107 09/14/2018 05:10 AM    CO2 24 09/14/2018 05:10 AM    GLUCOSE 95 09/14/2018 05:10 AM    BUN 12 09/14/2018 05:10 AM    CREATININE 0.63 09/14/2018 05:10 AM      Lab Results   Component Value Date/Time    ALTSGPT 11 09/14/2018 05:10 AM    ASTSGOT 14 09/14/2018 05:10 AM    ALKPHOSPHAT 55 09/14/2018 05:10 AM    TBILIRUBIN 0.8 09/14/2018 05:10 AM    LIPASE 26 09/14/2018 12:46 PM    ALBUMIN 3.7 09/14/2018 05:10 AM    GLOBULIN 2.9 09/14/2018 05:10 AM    PREALBUMIN 13.0 (L) 12/15/2016 11:03 AM    INR 1.04 09/13/2018 11:04 AM     Lab Results   Component Value Date/Time    PROTHROMBTM 13.3 09/13/2018 11:04 AM    INR 1.04 09/13/2018 11:04 AM

## 2018-09-14 NOTE — PROGRESS NOTES
Received report from evening RN.  Pt is A/Ox4.  Respirations are even and unlabored on RA.  Monitors applied, VSS.  Pt denies any CP or abdominal pain at this time.  Pt states she had large BM yesterday.  Plan of care reviewed, verbalized understanding.  Will continue to closely monitor. Call light within reach.

## 2018-09-14 NOTE — DISCHARGE INSTRUCTIONS
Discharge Instructions    Discharged to home by car with relative. Discharged via wheelchair, hospital escort: Yes.  Special equipment needed: Not Applicable    Be sure to schedule a follow-up appointment with your primary care doctor or any specialists as instructed.     Discharge Plan:   Diet Plan: Discussed  Activity Level: Discussed  Smoking Cessation Offered: Patient Counseled  Confirmed Follow up Appointment: Appointment Scheduled  Confirmed Symptoms Management: Discussed  Medication Reconciliation Updated: Yes  Influenza Vaccine Indication: Indicated: 9 to 64 years of age    I understand that a diet low in cholesterol, fat, and sodium is recommended for good health. Unless I have been given specific instructions below for another diet, I accept this instruction as my diet prescription.   Other diet:     Special Instructions: None    · Is patient discharged on Warfarin / Coumadin?   No     Depression / Suicide Risk    As you are discharged from this [facility] facility, it is important to learn how to keep safe from harming yourself.    Recognize the warning signs:  · Abrupt changes in personality, positive or negative- including increase in energy   · Giving away possessions  · Change in eating patterns- significant weight changes-  positive or negative  · Change in sleeping patterns- unable to sleep or sleeping all the time   · Unwillingness or inability to communicate  · Depression  · Unusual sadness, discouragement and loneliness  · Talk of wanting to die  · Neglect of personal appearance   · Rebelliousness- reckless behavior  · Withdrawal from people/activities they love  · Confusion- inability to concentrate     If you or a loved one observes any of these behaviors or has concerns about self-harm, here's what you can do:  · Talk about it- your feelings and reasons for harming yourself  · Remove any means that you might use to hurt yourself (examples: pills, rope, extension cords, firearm)  · Get  professional help from the community (Mental Health, Substance Abuse, psychological counseling)  · Do not be alone:Call your Safe Contact- someone whom you trust who will be there for you.  · Call your local CRISIS HOTLINE 893-4769 or 409-189-4783  · Call your local Children's Mobile Crisis Response Team [place] (954) 797-9998 or www.OneRoomRate.com  · Call the toll free National Suicide Prevention Hotlines   · National Suicide Prevention Lifeline 679-681-ZRUZ (2174)  · [employer] Hope Line Network 800-SUICIDE (988-0077)    Chest Pain, Nonspecific  It is often hard to give a specific diagnosis for the cause of chest pain. There is always a chance that your pain could be related to something serious, like a heart attack or a blood clot in the lungs. You need to follow up with your caregiver for further evaluation. More lab tests or other studies such as X-rays, electrocardiography, stress testing, or cardiac imaging may be needed to find the cause of your pain.  Most of the time, nonspecific chest pain improves within 2 to 3 days with rest and mild pain medicine. For the next few days, avoid physical exertion or activities that bring on pain. Do not smoke. Avoid drinking alcohol. Call your caregiver for routine follow-up as advised.   SEEK IMMEDIATE MEDICAL CARE IF:  · You develop increased chest pain or pain that radiates to the arm, neck, jaw, back, or abdomen.   · You develop shortness of breath, increased coughing, or you start coughing up blood.   · You have severe back or abdominal pain, nausea, or vomiting.   · You develop severe weakness, fainting, fever, or chills.   Document Released: 12/18/2006 Document Revised: 03/11/2013 Document Reviewed: 06/06/2008  ExitCare® Patient Information ©2013 [employer].      Constipation, Adult  Constipation is when a person has fewer bowel movements in a week than normal, has difficulty having a bowel movement, or has stools that are dry, hard, or larger than normal.  Constipation may be caused by an underlying condition. It may become worse with age if a person takes certain medicines and does not take in enough fluids.  Follow these instructions at home:  Eating and drinking  · Eat foods that have a lot of fiber, such as fresh fruits and vegetables, whole grains, and beans.  · Limit foods that are high in fat, low in fiber, or overly processed, such as french fries, hamburgers, cookies, candies, and soda.  · Drink enough fluid to keep your urine clear or pale yellow.  General instructions  · Exercise regularly or as told by your health care provider.  · Go to the restroom when you have the urge to go. Do not hold it in.  · Take over-the-counter and prescription medicines only as told by your health care provider. These include any fiber supplements.  · Practice pelvic floor retraining exercises, such as deep breathing while relaxing the lower abdomen and pelvic floor relaxation during bowel movements.  · Watch your condition for any changes.  · Keep all follow-up visits as told by your health care provider. This is important.  Contact a health care provider if:  · You have pain that gets worse.  · You have a fever.  · You do not have a bowel movement after 4 days.  · You vomit.  · You are not hungry.  · You lose weight.  · You are bleeding from the anus.  · You have thin, pencil-like stools.  Get help right away if:  · You have a fever and your symptoms suddenly get worse.  · You leak stool or have blood in your stool.  · Your abdomen is bloated.  · You have severe pain in your abdomen.  · You feel dizzy or you faint.  This information is not intended to replace advice given to you by your health care provider. Make sure you discuss any questions you have with your health care provider.  Document Released: 09/15/2005 Document Revised: 07/07/2017 Document Reviewed: 06/07/2017  [employer] Interactive Patient Education © 2017 [employer]

## 2018-09-14 NOTE — PROGRESS NOTES
Internal Medicine Interval Note  Note Author: Garett Oliva M.D.     Name Maite Cui     1965   Age/Sex 53 y.o. female   MRN [id number]   Code Status Full code      After 5PM or if no immediate response to page, please call for cross-coverage  Attending/Team:   karlie Partida MD  See Patient List for primary contact information  Call [phone] to page    1st Call - Day Intern (R1):   Garett Oliva MD  2nd Call - Day Sr. Resident (R2/R3):   Mohan Dowell MD          Reason for interval visit  (Principal Problem)   Abdominal pain       Interval Problem Daily Status Update  (24 hours, problem oriented, brief subjective history, new lab/imaging data pertinent to that problem)   - No incidents over night, repeated troponin normal. Lipase normal. Myocardial stress test normal. US abdomen normal. KUB x ray shows moderate constipation.   ROS  No headache, dizziness.     Disposition/Barriers to discharge:   None     Consultants/Specialty  None   PCP: Brian Mandujano M.D.      Quality Measures  Quality-Core Measures        Physical Exam       Vitals:    18 0050 18 0509 18 0837 18 1334   BP: 115/74 136/82 160/79 137/76   Pulse: 70 75 77 64   Resp: 16 15 18 20   Temp: 36.4 °C (97.6 °F) 36.7 °C (98 °F) 36.8 °C (98.3 °F) 36.7 °C (98 °F)   SpO2: 92% 97% 95% 96%   Weight:       Height:         Body mass index is 31.61 kg/m².    Oxygen Therapy:  Pulse Oximetry: 96 %, O2 (LPM): 0, O2 Delivery: None (Room Air)    Physical Exam       Constitutional: Well developed, Well nourished, No acute distress, Non-toxic appearance. , not distressed.     HENT: Normocephalic, Atraumatic, Bilateral external ears normal, Oropharynx is clear mucous membranes are moist. No oral exudates or nasal discharge.     Eyes: Pupils are equal round and reactive, EOMI, Conjunctiva normal, No discharge.     Neck: Normal range of motion, No tenderness, Supple, No stridor. No meningismus.    Lymphatic: No lymphadenopathy noted.    Cardiovascular: Regular rate and rhythm without murmur rub or gallop.   Thorax & Lungs: Clear breath sounds bilaterally without wheezes, rhonchi or rales. There is no chest wall tenderness.   Abdomen: Right Lower Quadrant tenderness,  There is no rebound or guarding. No organomegaly is appreciated. Bowel sounds are audible. No rebound tenderness.   Skin: Normal without rash.    Back: No CVA or spinal tenderness.   Extremities: Intact distal pulses, No edema, No tenderness, No cyanosis, No clubbing. Capillary refill is less than 3 seconds.  Musculoskeletal: Good range of motion in all major joints. No tenderness to palpation or major deformities noted.    Neurologic: Alert & oriented x 4, Normal motor function, Normal sensory function, No focal deficits noted. Reflexes are normal.   Psychiatric: Affect normal, Judgment normal, Mood normal. There is no suicidal ideation or patient reported hallucinations.        Assessment/Plan     Other emphysema (HCC)   Assessment & Plan    - Patient is compliant to medications.   - Controlled   - Continue on Albuterol  - Avoid triggers   - Respiratory protocol        Spondylosis of cervical region without myelopathy or radiculopathy- (present on admission)   Assessment & Plan    - Controlled on Acetaminophen.         Right lower quadrant abdominal pain   Assessment & Plan    - Rt lower abdominal pain associated with nauseas and vomiting.   - Progressive and intermittent since yesterday.   - No diarrhea, no constipation, not related to diet, no NSAID use, hx of negative H.Pylori.   - Vitally stable a febrile, RLQ and epigastric tenderness.  - CBC: no leucocytosis  - KUB shows moderate constipation.   - Troponin 2 sets are normal   - US abdomen: showed unremarkable liver and biliary tree. No intrahepatic biliary.   - Myocardial stress test today shows no evidence of significant jeopardized viable myocardium or prior myocardial infarction.Normal  left ventricular size, ejection fraction, and wall motion.    Plan:   - Likely because of constipation  - Patient will be discharged today on PPI with f/u with PCP in a week

## 2018-09-14 NOTE — DISCHARGE PLANNING
Care Transition Team Assessment    Met with pt at bedside. According to pt she lives with her adult son and daughter, independent at baseline, does not use assistive devices. No needs identified at this time. Pt said one of her children will provide transport at discharge.    Information Source  Orientation : Oriented x 4  Information Given By: Patient    Readmission Evaluation  Is this a readmission?: No    Interdisciplinary Discharge Planning  Does Admitting Nurse Feel This Could be a Complex Discharge?: No  Primary Care Physician: Brian Mandujano  Lives with - Patient's Self Care Capacity: Adult Children (Son and daughter)  Patient or legal guardian wants to designate a caregiver (see row info): No  Support Systems: Children (Adult children)  Housing / Facility: [address]  Do You Take your Prescribed Medications Regularly: Yes  Able to Return to Previous ADL's: Yes  Prior Services: None  Patient Expects to be Discharged to:: Home  Assistance Needed: No  Durable Medical Equipment: Not Applicable    Discharge Preparedness  What are your discharge supports?: Child (Adult children)  Prior Functional Level: Ambulatory, Independent with Activities of Daily Living, Independent with Medication Management  Difficulity with ADLs: None  Difficulity with IADLs: None    Functional Assesment  Prior Functional Level: Ambulatory, Independent with Activities of Daily Living, Independent with Medication Management    Finances  Prescription Coverage: Yes    Discharge Risks or Barriers  Discharge risks or barriers?: No    Anticipated Discharge Information  Anticipated discharge disposition: Home  Discharge Address: [facility] [address] [place]  Discharge Contact Phone Number: Patient [phone]

## 2018-09-16 ENCOUNTER — PATIENT OUTREACH (OUTPATIENT)
Dept: HEALTH INFORMATION MANAGEMENT | Facility: OTHER | Age: 53
End: 2018-09-16

## 2020-09-10 NOTE — PROGRESS NOTES
A/o,assessment completed,poc discussed,verbalized understanding,denies CP, sob, Ambulated to bathroom with steady gait.,per pt she had bm, call button within reach,will continue to monitor.   Detail Level: Detailed Introduction Text (Please End With A Colon): The following procedure was deferred:

## 2024-03-04 NOTE — ED NOTES
ASSIST RN: Pt ambulate to restroom   
Med rec complete per pt and Jag.   Allergies reviewed  
Present to ED because of Fall? (syncope, seizure, ALOC)  NO    Age > 70? NO    Altered Mental Status? NO    Impaired Mobility? No    Nursing Judgement (weakness, dizziness)? NO    Interventions:  Move patient closer to nurses' station  Familiarize the patient with environment  Place call light within reach and demonstrate call light use  Keep patient's personal possessions within patient safe reach  Place stretcher in low position and brakes locked  Keep floor surfaces clean and dry  Keep patient care areas uncluttered  Assess patient hourly for pain, personal needs, position change, and call light access.      
20